# Patient Record
Sex: MALE | Race: WHITE | Employment: FULL TIME | URBAN - METROPOLITAN AREA
[De-identification: names, ages, dates, MRNs, and addresses within clinical notes are randomized per-mention and may not be internally consistent; named-entity substitution may affect disease eponyms.]

---

## 2021-08-28 ENCOUNTER — HOSPITAL ENCOUNTER (EMERGENCY)
Age: 26
Discharge: HOME | End: 2021-08-28
Payer: COMMERCIAL

## 2021-08-28 VITALS
SYSTOLIC BLOOD PRESSURE: 117 MMHG | OXYGEN SATURATION: 99 % | HEIGHT: 72 IN | BODY MASS INDEX: 23.43 KG/M2 | DIASTOLIC BLOOD PRESSURE: 75 MMHG | HEART RATE: 64 BPM | RESPIRATION RATE: 19 BRPM | WEIGHT: 173 LBS | TEMPERATURE: 98.5 F

## 2021-08-28 DIAGNOSIS — S61.209A AVULSION OF SKIN OF FINGER, INITIAL ENCOUNTER: Primary | ICD-10-CM

## 2021-08-28 PROCEDURE — 6360000002 HC RX W HCPCS: Performed by: PHYSICIAN ASSISTANT

## 2021-08-28 PROCEDURE — 90715 TDAP VACCINE 7 YRS/> IM: CPT | Performed by: PHYSICIAN ASSISTANT

## 2021-08-28 PROCEDURE — 99282 EMERGENCY DEPT VISIT SF MDM: CPT

## 2021-08-28 PROCEDURE — 6370000000 HC RX 637 (ALT 250 FOR IP): Performed by: PHYSICIAN ASSISTANT

## 2021-08-28 PROCEDURE — 90471 IMMUNIZATION ADMIN: CPT | Performed by: PHYSICIAN ASSISTANT

## 2021-08-28 RX ADMIN — TETANUS TOXOID, REDUCED DIPHTHERIA TOXOID AND ACELLULAR PERTUSSIS VACCINE, ADSORBED 0.5 ML: 5; 2.5; 8; 8; 2.5 SUSPENSION INTRAMUSCULAR at 17:38

## 2021-08-28 RX ADMIN — Medication: at 17:38

## 2021-08-28 ASSESSMENT — PAIN SCALES - GENERAL: PAINLEVEL_OUTOF10: 7

## 2021-08-28 ASSESSMENT — PAIN DESCRIPTION - PAIN TYPE: TYPE: ACUTE PAIN

## 2021-08-28 ASSESSMENT — PAIN DESCRIPTION - LOCATION: LOCATION: HAND

## 2021-08-28 NOTE — ED NOTES
Bed: ED-10  Expected date:   Expected time:   Means of arrival:   Comments:  Procedure room     Marietta Roque RN  08/28/21 1335

## 2021-08-28 NOTE — ED NOTES
Discharge instructions reviewed with patient. Wound care discussed. Patient denies further questions and verbalizes understanding    Patient states his workplace is \"family owned\" and that he does not wish to complete worker's compensation.       Laurie Marroquin RN  08/28/21 0453

## 2021-08-28 NOTE — ED PROVIDER NOTES
EMERGENCY DEPARTMENT ENCOUNTER        PCP: Cristel Moss MD    CHIEF COMPLAINT    Chief Complaint   Patient presents with    Laceration     right 4th digit, cut on a madolin; work injury       This patient was not evaluated by the attending physician. I have independently evaluated this patient. HPI    Gerson Maher is a 23 y.o. male who presents with right fourth finger injury. Onset prior to arrival.  Context is patient states he was using a mandolin and excellently cut the end of his fourth finger. Patient is unsure the last time he had a tetanus vaccination. Patient denies any other injury. REVIEW OF SYSTEMS    General: Denies loss of consciousness. Skin: + Laceration. SEE HPI  Musculoskeletal:  No obvious tendon or motor deficits. Denies any other musculoskeletal injuries or skin trauma.     All other review of systems are negative  See HPI and nursing notes for additional information     PAST MEDICAL & SURGICAL HISTORY    Past Medical History:   Diagnosis Date    Hx of seasonal allergies      Past Surgical History:   Procedure Laterality Date    TONSILLECTOMY         CURRENT MEDICATIONS        ALLERGIES    Allergies   Allergen Reactions    Bee Venom Anaphylaxis       SOCIAL & FAMILY HISTORY    Social History     Socioeconomic History    Marital status: Single     Spouse name: Not on file    Number of children: Not on file    Years of education: Not on file    Highest education level: Not on file   Occupational History    Not on file   Tobacco Use    Smoking status: Current Some Day Smoker   Substance and Sexual Activity    Alcohol use: No    Drug use: Yes     Types: Marijuana    Sexual activity: Not on file   Other Topics Concern    Not on file   Social History Narrative    Not on file     Social Determinants of Health     Financial Resource Strain:     Difficulty of Paying Living Expenses:    Food Insecurity:     Worried About Running Out of Food in the Last Year:     Ran Out of Food in the Last Year:    Transportation Needs:     Lack of Transportation (Medical):  Lack of Transportation (Non-Medical):    Physical Activity:     Days of Exercise per Week:     Minutes of Exercise per Session:    Stress:     Feeling of Stress :    Social Connections:     Frequency of Communication with Friends and Family:     Frequency of Social Gatherings with Friends and Family:     Attends [employer] Services:     Active Member of Clubs or Organizations:     Attends Club or Organization Meetings:     Marital Status:    Intimate Partner Violence:     Fear of Current or Ex-Partner:     Emotionally Abused:     Physically Abused:     Sexually Abused:      No family history on file. PHYSICAL EXAM    VITAL SIGNS: /75   Pulse 64   Temp 98.5 °F (36.9 °C) (Oral)   Resp 19   Ht 6' (1.829 m)   Wt 173 lb (78.5 kg)   SpO2 99%   BMI 23.46 kg/m²   Constitutional:  Well developed, Appears comfortable  HEENT:  Normocephalic, Atraumatic. Musculoskeletal:  No gross deformities. No motor deficits. Integument:    Right fourth finger there is skin avulsion to the distal tip. Distal sensation. See below for further details. Neurologic:  Awake and alert, normal flow of speech. CN 2-12 intact. Psychiatric: Cooperative, pleasant affect        ________________________________________________________________________       Procedure Note - April Stein PA-C, [facility]    Wound care procedure Note    Indication: Skin avulsion    Procedure:   - Procedure explained, including risks and benefits explained to the patient who expressed understanding. All questions were answered. Verbal consent obtained. - The Wound was prepped using Hibiclens and sterile saline.  - The wound was not anesthetized  - Wound was explored to it's depth, no compromise of neurovascular or tendon structures, no foreign bodies.   - Wound was irrigated with copious amounts of sterile saline and mechanically debrided utilizing sterile gauze. - Hemostat pad placed  - Hemostasis and good cosmesis was achieved. Blood loss minimal.  - The wound area was then dressed with Sterile nonstick dressing, sterile gauze, and Kerlix. - Patient tolerated procedure well without complications. ________________________________________________________________________          ED COURSE & MEDICAL DECISION MAKING      Patient presents as above. See above procedure note for wound care. Patient updated on tetanus. No repairable laceration. I discussed signs of infection return immediately if these develop. Recommend follow-up with primary care provider in 2 to 3 days for wound check. Clinical  IMPRESSION    1. Avulsion of skin of finger, initial encounter        Wound care instructions discussed with patient today. Diagnosis and plan discussed in detail with patient who understands and agrees. Return to emergency Department precautions were discussed in detail with patient who understands and agrees. Comment: Please note this report has been produced using speech recognition software and may contain errors related to that system including errors in grammar, punctuation, and spelling, as well as words and phrases that may be inappropriate. If there are any questions or concerns please feel free to contact the dictating provider for clarification.          Mallorie Vasquez PA-C  08/28/21 3053